# Patient Record
Sex: FEMALE | Race: OTHER | URBAN - METROPOLITAN AREA
[De-identification: names, ages, dates, MRNs, and addresses within clinical notes are randomized per-mention and may not be internally consistent; named-entity substitution may affect disease eponyms.]

---

## 2018-12-26 ENCOUNTER — OFFICE VISIT (OUTPATIENT)
Dept: PRIMARY CARE CLINIC | Age: 43
End: 2018-12-26
Payer: COMMERCIAL

## 2018-12-26 VITALS
TEMPERATURE: 98 F | RESPIRATION RATE: 16 BRPM | BODY MASS INDEX: 27.67 KG/M2 | HEART RATE: 70 BPM | WEIGHT: 123 LBS | OXYGEN SATURATION: 98 % | SYSTOLIC BLOOD PRESSURE: 120 MMHG | DIASTOLIC BLOOD PRESSURE: 80 MMHG | HEIGHT: 56 IN

## 2018-12-26 DIAGNOSIS — J20.9 ACUTE BRONCHITIS, UNSPECIFIED ORGANISM: Primary | ICD-10-CM

## 2018-12-26 DIAGNOSIS — R05.9 COUGH: ICD-10-CM

## 2018-12-26 PROCEDURE — G8419 CALC BMI OUT NRM PARAM NOF/U: HCPCS | Performed by: PHYSICIAN ASSISTANT

## 2018-12-26 PROCEDURE — G8484 FLU IMMUNIZE NO ADMIN: HCPCS | Performed by: PHYSICIAN ASSISTANT

## 2018-12-26 PROCEDURE — 1036F TOBACCO NON-USER: CPT | Performed by: PHYSICIAN ASSISTANT

## 2018-12-26 PROCEDURE — G8427 DOCREV CUR MEDS BY ELIG CLIN: HCPCS | Performed by: PHYSICIAN ASSISTANT

## 2018-12-26 PROCEDURE — 99213 OFFICE O/P EST LOW 20 MIN: CPT | Performed by: PHYSICIAN ASSISTANT

## 2018-12-26 RX ORDER — DEXTROMETHORPHAN HYDROBROMIDE AND PROMETHAZINE HYDROCHLORIDE 15; 6.25 MG/5ML; MG/5ML
5 SYRUP ORAL 4 TIMES DAILY PRN
Qty: 200 ML | Refills: 0 | Status: SHIPPED | OUTPATIENT
Start: 2018-12-26

## 2018-12-26 RX ORDER — LEVOTHYROXINE SODIUM 112 UG/1
112 TABLET ORAL DAILY
COMMUNITY

## 2018-12-26 RX ORDER — AZITHROMYCIN 250 MG/1
TABLET, FILM COATED ORAL
Qty: 1 PACKET | Refills: 0 | Status: SHIPPED | OUTPATIENT
Start: 2018-12-26

## 2018-12-26 ASSESSMENT — PATIENT HEALTH QUESTIONNAIRE - PHQ9: DEPRESSION UNABLE TO ASSESS: PT REFUSES

## 2018-12-26 ASSESSMENT — ENCOUNTER SYMPTOMS
WHEEZING: 1
COUGH: 1
SHORTNESS OF BREATH: 0
RHINORRHEA: 1
SORE THROAT: 1

## 2018-12-26 NOTE — PROGRESS NOTES
Subjective     Efrain Broussard 37 y.o. female presents 12/26/18 with   Chief Complaint   Patient presents with    URI     x2 weeks pt has c.o cough,congestion, sinus pressure, drainage, headache, and sore throat        Cough   This is a new problem. The current episode started in the past 7 days. The problem has been gradually worsening. The problem occurs constantly. The cough is productive of sputum. Associated symptoms include nasal congestion, rhinorrhea, a sore throat and wheezing. Pertinent negatives include no chills, ear congestion, ear pain, fever, headaches, myalgias, postnasal drip, rash, shortness of breath, sweats or weight loss. Nothing aggravates the symptoms. Treatments tried: Mucinex, Theraflu. The treatment provided no relief. There is no history of asthma. Reviewed thefollowing history:    No past medical history on file. No past surgical history on file. No family history on file. Allergies   Allergen Reactions    Pcn [Penicillins]        Current Outpatient Prescriptions   Medication Sig Dispense Refill    levothyroxine (SYNTHROID) 112 MCG tablet Take 112 mcg by mouth Daily      azithromycin (ZITHROMAX) 250 MG tablet Take 2 tabs (500 mg) on Day 1, and take 1 tab (250 mg) on days 2 through 5. 1 packet 0    promethazine-dextromethorphan (PROMETHAZINE-DM) 6.25-15 MG/5ML syrup Take 5 mLs by mouth 4 times daily as needed for Cough 200 mL 0     No current facility-administered medications for this visit. Review of Systems   Constitutional: Negative for chills, fever and weight loss. HENT: Positive for rhinorrhea and sore throat. Negative for ear pain and postnasal drip. Respiratory: Positive for cough and wheezing. Negative for shortness of breath. Musculoskeletal: Negative for myalgias. Skin: Negative for rash. Neurological: Negative for headaches.        Objective    Vitals:    12/26/18 1702   BP: 120/80   Pulse: 70   Resp: 16   Temp: 98 °F (36.7 °C)   TempSrc:

## 2018-12-26 NOTE — PATIENT INSTRUCTIONS
good.  When should you call for help? Call 911 anytime you think you may need emergency care. For example, call if:    · You have severe trouble breathing.    Call your doctor now or seek immediate medical care if:    · You have new or worse trouble breathing.     · You cough up dark brown or bloody mucus (sputum).     · You have a new or higher fever.     · You have a new rash.    Watch closely for changes in your health, and be sure to contact your doctor if:    · You cough more deeply or more often, especially if you notice more mucus or a change in the color of your mucus.     · You are not getting better as expected. Where can you learn more? Go to [email or web address]. org and sign in to your [employer] account. Enter H333 in the [employer] box to learn more about \"Bronchitis: Care Instructions. \"     If you do not have an account, please click on the \"Sign Up Now\" link. Current as of: December 6, 2017  Content Version: 11.8  © 9731-8934 [employer]. Care instructions adapted under license by [facility] ([facility]). If you have questions about a medical condition or this instruction, always ask your healthcare professional. [place] [address] any warranty or liability for your use of this information.

## 2024-09-10 ENCOUNTER — APPOINTMENT (OUTPATIENT)
Dept: OTOLARYNGOLOGY | Facility: CLINIC | Age: 49
End: 2024-09-10
Payer: COMMERCIAL

## 2024-09-10 VITALS
DIASTOLIC BLOOD PRESSURE: 84 MMHG | HEIGHT: 59 IN | BODY MASS INDEX: 26.21 KG/M2 | SYSTOLIC BLOOD PRESSURE: 123 MMHG | WEIGHT: 130 LBS

## 2024-09-10 DIAGNOSIS — K21.9 LARYNGOPHARYNGEAL REFLUX (LPR): Primary | ICD-10-CM

## 2024-09-10 DIAGNOSIS — R09.81 NASAL CONGESTION: ICD-10-CM

## 2024-09-10 PROCEDURE — 99203 OFFICE O/P NEW LOW 30 MIN: CPT | Performed by: STUDENT IN AN ORGANIZED HEALTH CARE EDUCATION/TRAINING PROGRAM

## 2024-09-10 PROCEDURE — 31575 DIAGNOSTIC LARYNGOSCOPY: CPT | Performed by: STUDENT IN AN ORGANIZED HEALTH CARE EDUCATION/TRAINING PROGRAM

## 2024-09-10 PROCEDURE — 1036F TOBACCO NON-USER: CPT | Performed by: STUDENT IN AN ORGANIZED HEALTH CARE EDUCATION/TRAINING PROGRAM

## 2024-09-10 PROCEDURE — 3008F BODY MASS INDEX DOCD: CPT | Performed by: STUDENT IN AN ORGANIZED HEALTH CARE EDUCATION/TRAINING PROGRAM

## 2024-09-10 RX ORDER — LEVOTHYROXINE SODIUM 100 UG/1
100 TABLET ORAL
COMMUNITY
Start: 2024-09-09

## 2024-09-10 RX ORDER — OMEPRAZOLE 20 MG/1
20 TABLET, DELAYED RELEASE ORAL
Qty: 30 TABLET | Refills: 3 | Status: SHIPPED | OUTPATIENT
Start: 2024-09-10

## 2024-09-10 RX ORDER — FLUTICASONE PROPIONATE 50 MCG
2 SPRAY, SUSPENSION (ML) NASAL DAILY
Qty: 15.8 ML | Refills: 3 | Status: SHIPPED | OUTPATIENT
Start: 2024-09-10

## 2024-09-10 NOTE — PROGRESS NOTES
ENT Outpatient Consultation    Chief Complaint: hoarseness, nasal congestion  History Of Present Illness  Shanda Reece is a 48 y.o. female presents for voice changes that have been present for roughly the last year. She reports that her vocal quality fluctuates and some days she wakes up with a hoarse voice. She does not have high vocal demands at work. No dysphagia or odynophagia. No chronic cough. She has a remote history of social smoking 30+ years ago. Also endorses nasal congestion that is bothersome when she sleeps. She was previously prescribed flonase but did not start it because she was worried of the systemic effects of steroids.     Of note, history obtained with the assistance of the patient's son who acted as her . Official  offered to the patient but she declined.     Past Medical History  She has no past medical history on file.  History of VIZCAINO for Grave's disease    Surgical History  She has no past surgical history on file.     Social History  History of smoking 30+ years ago  Social alcohol use    Family History  No family history on file.     Allergies  Penicillins     Physical Exam:  CONSTITUTIONAL:  No acute distress  VOICE:  No hoarseness or other abnormality  RESPIRATION:  Breathing comfortably, no stridor  EYES:  EOM intact, sclera normal  NEURO:  Alert and oriented times 3, Cranial nerves II-XII grossly intact and symmetric bilaterally  HEAD AND FACE:  Symmetric facial features, no masses or lesions, sinuses non-tender to palpation  EARS:  Normal external ears  NOSE:  External nose midline, anterior rhinoscopy shows an inferior septal spur that is nonobstructive, no inferior turbinate hypertrophy but mild polypoid changes, no bleeding or drainage, no lesions  ORAL CAVITY/OROPHARYNX/LIPS:  Normal mucous membranes, normal floor of mouth/tongue/OP, no masses or lesions  PHARYNGEAL WALLS:  No masses or lesions  NECK/LYMPH:  No LAD, no thyroid masses, trachea  "midline  SKIN:  Neck skin is without scar or injury  PSYCH:  Alert and oriented with appropriate mood and affect    Procedure Note: Flexible Nasolaryngoscopy  Verbal informed consent was obtained from the patient/patient's guardian. 4% lidocaine mixed with phenylephrine was prepared and dripped into the nose. It was placed in the right and left naris. Following an appropriate amount of time to allow for adequate anesthesia, a flexible fiberoptic nasolaryngoscope was placed into the patient's right and left naris. The nasal cavity, nasopharynx, oropharynx, hypopharynx, and all endolaryngeal structures were visualized and were normal except as listed below. Significant findings included:  -polypoid changes of the inferior turbinates bilaterally without hypertrophy  -Supraglottic edema of the arytenoids, AE folds, interarytenoid space  -Possible mild atrophy of the left vocal cord, VCs mobile bilaterally  -No concerning masses or lesions       Last Recorded Vitals  Blood pressure 123/84, height 1.499 m (4' 11\"), weight 59 kg (130 lb).    Relevant Results  XR thoracic spine 2 views    Result Date: 9/9/2024  * * *Final Report* * * DATE OF EXAM: Sep  9 2024  2:34PM   LUX   [id number]  -  XR THORACIC 2V AP/LAT  / ACCESSION #  [id number] PROCEDURE REASON: Midline low back pain with sciatica, sciatica laterality unspecified, unspecifie      * * * * Physician Interpretation * * * *  EXAM TITLE:  XR THORACIC 2V AP/LAT EXAM DATE/TIME:  9/9/2024 2:34 PM COMPARISON:  None. CLINICAL INDICATION/HISTORY:  Low back pain. TECHNIQUE:  AP, swimmer's and lateral views of the thoracic spine are presented FINDINGS: No fractures or subluxations are noted. The disc spaces are grossly preserved. There is mild osteophyte formation. There is no paraspinal mass or bony destructive process. Others: Degenerative changes seen in the lower cervical spine.    IMPRESSION: Thoracic spine degenerative changes. : ANY   Transcribe Date/Time: Sep "  9 2024  5:12P Dictated by : TELLO ABDULLAHI MD This examination was interpreted and the report reviewed and electronically signed by: TELLO ABDULLAHI MD on Sep  9 2024  5:13PM  EST      Assessment and Plan  48 y.o. female with hoarseness and nasal congestion. She has no red flag symptoms for malignancy such as weight loss, dysphagia/odynophagia. Hoarseness fluctuates from day to day. Laryngoscopy today showed moderate edema of the glottis and supraglottis consistent with LPR. Also with some polypoid changes of the inferior turbinates without significant hypertrophy. She is bothered by nasal congestion particularly when she sleeps.    Problem List Items Addressed This Visit    None  Visit Diagnoses       Laryngopharyngeal reflux (LPR)    -  Primary    Relevant Medications    omeprazole OTC (PriLOSEC OTC) 20 mg EC tablet    Nasal congestion        Relevant Medications    fluticasone (Flonase) 50 mcg/actuation nasal spray          Increase hydration  RTC 2 months for repeat exam  If hoarseness does not improve will send to laryngology for stroboscopic exam    Alisson Siddiqui MD

## 2024-11-12 ENCOUNTER — APPOINTMENT (OUTPATIENT)
Dept: OTOLARYNGOLOGY | Facility: CLINIC | Age: 49
End: 2024-11-12
Payer: COMMERCIAL

## 2025-05-09 DIAGNOSIS — R09.81 NASAL CONGESTION: ICD-10-CM

## 2025-05-13 RX ORDER — FLUTICASONE PROPIONATE 50 MCG
2 SPRAY, SUSPENSION (ML) NASAL DAILY
Qty: 48 ML | Refills: 1 | Status: SHIPPED | OUTPATIENT
Start: 2025-05-13